# Patient Record
Sex: FEMALE | Race: WHITE | NOT HISPANIC OR LATINO | URBAN - METROPOLITAN AREA
[De-identification: names, ages, dates, MRNs, and addresses within clinical notes are randomized per-mention and may not be internally consistent; named-entity substitution may affect disease eponyms.]

---

## 2022-08-18 ENCOUNTER — TRANSFERRED RECORDS (OUTPATIENT)
Dept: HEALTH INFORMATION MANAGEMENT | Facility: CLINIC | Age: 67
End: 2022-08-18

## 2024-01-06 ENCOUNTER — OFFICE VISIT (OUTPATIENT)
Dept: FAMILY MEDICINE | Facility: CLINIC | Age: 69
End: 2024-01-06
Payer: MEDICARE

## 2024-01-06 VITALS
SYSTOLIC BLOOD PRESSURE: 124 MMHG | WEIGHT: 142 LBS | OXYGEN SATURATION: 100 % | DIASTOLIC BLOOD PRESSURE: 85 MMHG | TEMPERATURE: 97.9 F | HEART RATE: 61 BPM | RESPIRATION RATE: 20 BRPM

## 2024-01-06 DIAGNOSIS — J06.9 VIRAL URI: Primary | ICD-10-CM

## 2024-01-06 DIAGNOSIS — H61.23 BILATERAL IMPACTED CERUMEN: ICD-10-CM

## 2024-01-06 PROBLEM — E78.5 HYPERLIPIDEMIA: Status: ACTIVE | Noted: 2021-08-04

## 2024-01-06 PROBLEM — E11.9 TYPE 2 DIABETES MELLITUS WITHOUT COMPLICATIONS (H): Status: ACTIVE | Noted: 2024-01-06

## 2024-01-06 PROBLEM — D32.9 MENINGIOMA (H): Status: ACTIVE | Noted: 2022-09-21

## 2024-01-06 PROBLEM — F79 INTELLECTUAL DISABILITY: Status: ACTIVE | Noted: 2024-01-06

## 2024-01-06 PROBLEM — C50.811 MALIGNANT NEOPLASM OF OVERLAPPING SITES OF RIGHT BREAST IN FEMALE, ESTROGEN RECEPTOR POSITIVE (H): Status: ACTIVE | Noted: 2022-08-09

## 2024-01-06 PROBLEM — Z17.0 MALIGNANT NEOPLASM OF OVERLAPPING SITES OF RIGHT BREAST IN FEMALE, ESTROGEN RECEPTOR POSITIVE (H): Status: ACTIVE | Noted: 2022-08-09

## 2024-01-06 PROBLEM — E11.65 TYPE 2 DIABETES MELLITUS WITH HYPERGLYCEMIA (H): Status: ACTIVE | Noted: 2024-01-06

## 2024-01-06 PROCEDURE — 99203 OFFICE O/P NEW LOW 30 MIN: CPT | Mod: 25 | Performed by: NURSE PRACTITIONER

## 2024-01-06 PROCEDURE — 69209 REMOVE IMPACTED EAR WAX UNI: CPT | Mod: 50 | Performed by: NURSE PRACTITIONER

## 2024-01-06 RX ORDER — ANASTROZOLE 1 MG/1
1 TABLET ORAL DAILY
COMMUNITY
Start: 2024-01-05

## 2024-01-06 RX ORDER — MIRTAZAPINE 15 MG/1
15 TABLET, FILM COATED ORAL
COMMUNITY
Start: 2023-12-11

## 2024-01-06 RX ORDER — LEVETIRACETAM 1000 MG/1
1000 TABLET ORAL
COMMUNITY
Start: 2023-11-24

## 2024-01-06 RX ORDER — ATORVASTATIN CALCIUM 40 MG/1
1 TABLET, FILM COATED ORAL DAILY
COMMUNITY
Start: 2022-10-15

## 2024-01-06 RX ORDER — LISINOPRIL 2.5 MG/1
2.5 TABLET ORAL DAILY
COMMUNITY
Start: 2023-02-27

## 2024-01-06 RX ORDER — FLUTICASONE PROPIONATE 50 MCG
SPRAY, SUSPENSION (ML) NASAL
COMMUNITY
Start: 2022-09-14

## 2024-01-06 ASSESSMENT — ENCOUNTER SYMPTOMS: COUGH: 1

## 2024-01-06 NOTE — PROCEDURES
Cerumen removal:     Ear flush was used to remove cerumen from both ear(s) by medical assistant    Right ear cerumen relieved.  Normal ear exam following.  I also tried to remove wax from left ear with curette.  Patient said it hurt too much and I did stop.  Large amount of wax was removed, but still impacted wax noted.    Will prescribe Debrox.    Dorcas Cho, CNP

## 2024-01-06 NOTE — PROGRESS NOTES
"Assessment & Plan     Viral URI      Bilateral impacted cerumen    -Bilateral ear flush done today.  Severe wax impaction on the left.  Unable to completely remove wax.  Debrox prescribed and described how to use.  Recheck if not better.    Focused exam and history done due to COVID-19 pandemic in a walk-in setting.      History, exam, and vital signs consistent with a viral URI.  Mild.  No cough noted during exam.    No red flags.     Recheck if shortness of breath or new fevers develop.  Rest.     OTCs recommended: None [   ].  Dextromethorphan  [ x ], guaifenesin [ x ], pseudoephedrine [   ], Afrin for no greater than 3 days [  ], Tylenol or ibuprofen [  ].                Return in about 10 days (around 1/16/2024).    Dorcas Cho [facility]    Smita Smith is a 68 year old female who presents to clinic today for the following health issues:  Chief Complaint   Patient presents with    Cough     Has had cough  chest congestion over 1 month     Cough        Lives in group home. Non smoker.      Cough x 1 month.  Felt ill when it started. Fatigue.  No covid tests.     Dry cough.      \"Sometimes\" nasal congestion.  No facial pain.      Type II diabetic. Dev delay.      Sometimes taking cough medicine.          Review of Systems   Respiratory:  Positive for cough.            Objective    /85   Pulse 61   Temp 97.9  F (36.6  C) (Tympanic)   Resp 20   Wt 64.4 kg (142 lb)   SpO2 100%   Physical Exam  Constitutional:       Appearance: Normal appearance.   HENT:      Right Ear: There is impacted cerumen.      Left Ear: There is impacted cerumen.      Nose: No congestion or rhinorrhea.      Comments: No facial tenderness.  Eyes:      Conjunctiva/sclera: Conjunctivae normal.   Pulmonary:      Effort: Pulmonary effort is normal.      Breath sounds: Normal breath sounds. No wheezing.   Musculoskeletal:         General: Normal range of motion.   Skin:     General: " Skin is warm.   Neurological:      Mental Status: She is alert and oriented to person, place, and time.   Psychiatric:         Mood and Affect: Mood normal.

## 2024-01-06 NOTE — PATIENT INSTRUCTIONS
Continue flonase.      Cough medicine as needed.      Recheck if fevers, worse breathing or not starting to improve in a week or two.      Use debrox ear drops for up to 4 days on left side only.  See handout for how to do this.  If you do not have anything to rinse the ear with, you can use just water from the shower.